# Patient Record
Sex: FEMALE | Race: WHITE | NOT HISPANIC OR LATINO | Employment: OTHER | URBAN - METROPOLITAN AREA
[De-identification: names, ages, dates, MRNs, and addresses within clinical notes are randomized per-mention and may not be internally consistent; named-entity substitution may affect disease eponyms.]

---

## 2023-05-29 ENCOUNTER — HOSPITAL ENCOUNTER (EMERGENCY)
Facility: HOSPITAL | Age: 88
Discharge: NURSING, REHAB OR LONG-TERM CARE | End: 2023-05-30
Attending: EMERGENCY MEDICINE | Admitting: EMERGENCY MEDICINE
Payer: MEDICARE

## 2023-05-29 DIAGNOSIS — F03.918 DEMENTIA WITH OTHER BEHAVIORAL DISTURBANCE, UNSPECIFIED DEMENTIA SEVERITY, UNSPECIFIED DEMENTIA TYPE: Primary | ICD-10-CM

## 2023-05-29 DIAGNOSIS — Z13.9 ENCOUNTER FOR MEDICAL SCREENING EXAMINATION: ICD-10-CM

## 2023-05-29 PROCEDURE — 99283 EMERGENCY DEPT VISIT LOW MDM: CPT

## 2023-05-30 VITALS
BODY MASS INDEX: 21.47 KG/M2 | HEART RATE: 75 BPM | WEIGHT: 150 LBS | OXYGEN SATURATION: 97 % | DIASTOLIC BLOOD PRESSURE: 82 MMHG | HEIGHT: 70 IN | SYSTOLIC BLOOD PRESSURE: 163 MMHG | TEMPERATURE: 97.1 F | RESPIRATION RATE: 16 BRPM

## 2023-05-30 PROCEDURE — 25010000002 ZIPRASIDONE MESYLATE PER 10 MG: Performed by: EMERGENCY MEDICINE

## 2023-05-30 PROCEDURE — 96372 THER/PROPH/DIAG INJ SC/IM: CPT

## 2023-05-30 RX ORDER — ZIPRASIDONE MESYLATE 20 MG/ML
10 INJECTION, POWDER, LYOPHILIZED, FOR SOLUTION INTRAMUSCULAR ONCE
Status: COMPLETED | OUTPATIENT
Start: 2023-05-30 | End: 2023-05-30

## 2023-05-30 RX ADMIN — ZIPRASIDONE MESYLATE 10 MG: 20 INJECTION, POWDER, LYOPHILIZED, FOR SOLUTION INTRAMUSCULAR at 00:19

## 2023-05-30 NOTE — ED PROVIDER NOTES
" EMERGENCY DEPARTMENT ENCOUNTER    Room Number:  17/17  Date seen:  5/30/2023  PCP: System, Provider Not In  Historian: EMS, nursing facility and patient  Relevant information provided by sources other than the patient, review of external records, and social determinants of health may be included in the HPI section.      HPI:  Chief Complaint: Sent in for a well exam  Additional historical features obtained directly from: In talking with the nursing home, they stated the patient was found in another residence room on the floor apparently sleeping.  When they went to try and get her up to take her back to her room she refused.  She did not appear injured, no one knows if she fell or not, and when they called the patient's family they insisted that she come to the ER for evaluation  Context: Stacie Gonzalez is a 87 y.o. female who presents to the ED c/o patient complains of nothing except for the fact that she does not want to be here.  She has severe dementia and does not understand why she is here.  She said she feels fine, and when I ask her why she was on the floor she said \"because I wanted to be\".    According to nursing facility, she was at her baseline mental status when she left, they did not see any evidence of injuries, but as a matter of policy they required syndrome and to be evaluated.        Initial impression including social determinants which will impact the assessment they are contradicting histories in this case, and the patient is demented and not happy about being here.  We had a lot of difficulty trying to get her to stay in bed and not wander around the hallways.  She started become fairly belligerent with the staff and distracting and potentially danger to herself.  I decided to give her a dose of Geodon which seemed to help          PAST MEDICAL HISTORY  Active Ambulatory Problems     Diagnosis Date Noted   • No Active Ambulatory Problems     Resolved Ambulatory Problems     Diagnosis Date " "Noted   • No Resolved Ambulatory Problems     No Additional Past Medical History         PAST SURGICAL HISTORY  No past surgical history on file.      FAMILY HISTORY  No family history on file.      SOCIAL HISTORY  Social History     Socioeconomic History   • Marital status: Single         ALLERGIES  Patient has no known allergies.          PHYSICAL EXAM  ED Triage Vitals [05/29/23 2301]   Temp Heart Rate Resp BP SpO2   97.1 °F (36.2 °C) 75 16 160/84 97 %      Temp src Heart Rate Source Patient Position BP Location FiO2 (%)   Tympanic Monitor Lying Right arm --         Physical Exam      GENERAL: Awake and alert, nontoxic-appearing, clearly has severe dementia  HENT: nares patent, NCAT, neck nontender  EYES: no scleral icterus, PERRL, EOMI  CV: regular rhythm, normal rate, distal pulses symmetric and palpable  RESPIRATORY: normal effort, CTA bilaterally  ABDOMEN: soft, nondistended nontender with normal bowel sound  MUSCULOSKELETAL: no deformity, pelvis stable and no spine tenderness  NEURO: alert, moves all extremities, follows commands, no obvious focal deficits on exam, she is ambulatory on her own without difficulty, but she definitely has some dementia with a propensity for behavioral disturbance when agitated  PSYCH: Irritable and not particularly cooperative  SKIN: warm, dry with no rash        LAB RESULTS  No results found for this or any previous visit (from the past 24 hour(s)).    Ordered the above labs and my independent interpretation of these results are discussed in the section labeled \"ED course\" below        RADIOLOGY  No Radiology Exams Resulted Within Past 24 Hours    Ordered the above noted radiological studies.  Independently interpreted by me in PACS.  My independent interpretation of these results are discussed in the section below labeled \"ED course\"        PROCEDURES  Procedures          MEDICATIONS GIVEN IN ER  Medications   ziprasidone (GEODON) injection 10 mg (has no administration in " "time range)                   MEDICAL DECISION MAKING and INDEPENDENT INTERPRETATIONS      Everything documented below this statement is the \"ED course\" section which I have referred to above.  Everything discussed in the following section constitutes MY INDEPENDENT INTERPRETATIONS of the lab work, EKGs, and radiology studies, and all of my personal conversations with other providers, and all of my independent decision making regarding this patient are discussed below.      ED Course as of 05/30/23 0140   Tue May 30, 2023   0139 Again, the patient had no interest in being here and said that there is nothing wrong with her.  We did call the nursing facility to confirm the history is given by EMS.  That actually is an accurate depiction, and actually stated that they did not suspect an injury although their policy and the family wanted her to be sent for evaluation [DP]   0139 No labs or imaging are indicated in this case.  Patient has pretty severe dementia and perfectly safe to return to her memory care unit [DP]      ED Course User Index  [DP] Robert Moses MD         Everything documented above with this statement, in the ED course section constitutes my independent interpretation of lab work EKG and radiology studies along with my personal conversations with other providers and my independent medical decision making.  This statement will not be repeated before each data point, but they are all my independent interpretation needs contained within the \"ED course\" section.             All appropriate hygiene and PPE requirements were satisfied with this patient encounter      FINAL DIAGNOSES  Final diagnoses:   Dementia with other behavioral disturbance, unspecified dementia severity, unspecified dementia type   Encounter for medical screening examination           DISPOSITION  Discharge          Latest Documented Vital Signs:  As of 01:40 EDT  BP- 160/84 HR- 75 Temp- 97.1 °F (36.2 °C) (Tympanic) O2 sat- " 97%        --    Please note that portions of this were completed with a voice recognition program.       Note Disclaimer: At [facility], we believe that sharing information builds trust and better relationships. You are receiving this note because you are receiving care at [facility] or recently visited. It is possible you will see health information before a provider has talked with you about it. This kind of information can be easy to misunderstand. To help you fully understand what it      Robert Moses MD  05/30/23 0140

## 2023-05-30 NOTE — ED NOTES
"Pt arrives from [facility] via [facility].    EMS states \"Pt does not have any complaints at this time. Staff stated that the Pt sat herself on the floor and due to that she needed to be sent out for evaluation.\"    Pt is at base line in triage. Pt has no complaints at this time, Pt states \"Nothing is wrong. Why am I here?\"    "

## 2023-05-30 NOTE — ED NOTES
EMS called to set up transport to take pt back to [facility], awaiting return call for  time.

## 2023-05-30 NOTE — ED NOTES
Legal guardian informed that pt is to be discharged and returned to facility via EMS.   This RN attempted to call the Nursing facility, no answer at this time.